# Patient Record
Sex: FEMALE | URBAN - METROPOLITAN AREA
[De-identification: names, ages, dates, MRNs, and addresses within clinical notes are randomized per-mention and may not be internally consistent; named-entity substitution may affect disease eponyms.]

---

## 2021-03-11 ENCOUNTER — APPOINTMENT (RX ONLY)
Dept: URBAN - METROPOLITAN AREA CLINIC 55 | Facility: CLINIC | Age: 36
Setting detail: DERMATOLOGY
End: 2021-03-11

## 2021-03-11 ENCOUNTER — RX ONLY (OUTPATIENT)
Age: 36
Setting detail: RX ONLY
End: 2021-03-11

## 2021-03-11 DIAGNOSIS — Z41.9 ENCOUNTER FOR PROCEDURE FOR PURPOSES OTHER THAN REMEDYING HEALTH STATE, UNSPECIFIED: ICD-10-CM

## 2021-03-11 PROCEDURE — ? COSMETIC CONSULTATION: FILLERS

## 2021-03-11 RX ORDER — VALACYCLOVIR 500 MG/1
TABLET, FILM COATED ORAL
Qty: 10 | Refills: 1 | Status: ERX | COMMUNITY
Start: 2021-03-11

## 2021-03-23 ENCOUNTER — APPOINTMENT (RX ONLY)
Dept: URBAN - METROPOLITAN AREA CLINIC 55 | Facility: CLINIC | Age: 36
Setting detail: DERMATOLOGY
End: 2021-03-23

## 2021-03-23 DIAGNOSIS — Z41.9 ENCOUNTER FOR PROCEDURE FOR PURPOSES OTHER THAN REMEDYING HEALTH STATE, UNSPECIFIED: ICD-10-CM

## 2021-03-23 PROCEDURE — ? DYSPORT

## 2021-03-23 PROCEDURE — ? FILLERS

## 2021-03-23 NOTE — PROCEDURE: FILLERS
Include Cannula Information In Note?: No
Lateral Face Filler Volume In Cc: 0
Anesthesia Type: 1% lidocaine with epinephrine
Lot #: [id number]
Detail Level: Detailed
Include Cannula Information In Note?: Yes
Include Cannula Size?: 27G
Price (Use Numbers Only, No Special Characters Or $): 107
Additional Anesthesia Volume In Cc: 6
Anesthesia Volume In Cc: 0.3
Consent: Written consent obtained. Risks include but not limited to bruising, beading, irregular texture, ulceration, infection, allergic reaction, scar formation, incomplete augmentation, temporary nature, procedural pain.
Expiration Date (Month Year): 3/31/2023
Post-Care Instructions: Patient instructed to apply ice to reduce swelling. Post care handout given to patient.
Filler: Restylane
Map Statment: See Attach Map for Complete Details

## 2021-03-23 NOTE — PROCEDURE: DYSPORT
Show Right And Left Brow Units: No
Show Glabellar Units: Yes
Consent: Written consent obtained. Risks include but not limited to lid/brow ptosis, bruising, swelling, diplopia, temporary effect, incomplete chemical denervation.
Periorbital Skin Units: 20
Left Periorbital Skin Units: 0
Forehead Units: 10
Post-Care Instructions: Patient instructed to not lie down for 4 hours and limit physical activity for 24 hours. Follow up 7 days if needed.
Glabellar Complex Units: 50
Expiration Date (Month Year): 10/31/2021
Additional Area 1 Location: lip
Price (Use Numbers Only, No Special Characters Or $): 490
Detail Level: Detailed
Lot #: [id number]

## 2021-11-29 ENCOUNTER — APPOINTMENT (RX ONLY)
Dept: URBAN - METROPOLITAN AREA CLINIC 55 | Facility: CLINIC | Age: 36
Setting detail: DERMATOLOGY
End: 2021-11-29

## 2021-11-29 DIAGNOSIS — Z41.9 ENCOUNTER FOR PROCEDURE FOR PURPOSES OTHER THAN REMEDYING HEALTH STATE, UNSPECIFIED: ICD-10-CM

## 2021-11-29 PROCEDURE — ? FILLERS

## 2021-11-29 PROCEDURE — ? DYSPORT

## 2021-11-29 NOTE — PROCEDURE: FILLERS
Expiration Date (Month Year): 06/20/2024
Marionette Lines Filler Volume In Cc: 0
Lot #: [id number]
Anesthesia Type: 1% lidocaine with epinephrine
Lot #: [id number]
Price (Use Numbers Only, No Special Characters Or $): 096
Post-Care Instructions: Patient instructed to apply ice to reduce swelling. Post care handout given to patient.
Filler: Restylane Kysse
Use Map Statement For Sites (Optional): No
Additional Anesthesia Volume In Cc: 6
Expiration Date (Month Year): 04/30/23
Include Cannula Information In Note?: Yes
Anesthesia Volume In Cc: 0.3
Expiration Date (Month Year): 10/31/2022
Lot #: [id number]
Detail Level: Detailed
Include Cannula Size?: 27G
Consent: Written consent obtained. Risks include but not limited to bruising, beading, irregular texture, ulceration, infection, allergic reaction, scar formation, incomplete augmentation, temporary nature, procedural pain.
Map Statment: See Attach Map for Complete Details

## 2021-11-29 NOTE — PROCEDURE: DYSPORT
Levator Labii Superioris Units: 0
Show Ucl Units: No
Show Glabellar Units: Yes
Lot #: [id number]
Consent: Written consent obtained. Risks include but not limited to lid/brow ptosis, bruising, swelling, diplopia, temporary effect, incomplete chemical denervation.
Additional Area 1 Location: lip
Price (Use Numbers Only, No Special Characters Or $): 408
Detail Level: Detailed
Post-Care Instructions: Patient instructed to not lie down for 4 hours and limit physical activity for 24 hours. Follow up 7 days if needed.
Dilution (U/0.1 Cc): 10
Glabellar Complex Units: 50
Expiration Date (Month Year): 7/31/2022
Periorbital Skin Units: 20
Forehead Units: 15

## 2022-04-19 ENCOUNTER — APPOINTMENT (RX ONLY)
Dept: URBAN - METROPOLITAN AREA CLINIC 55 | Facility: CLINIC | Age: 37
Setting detail: DERMATOLOGY
End: 2022-04-19

## 2022-04-19 DIAGNOSIS — Z41.9 ENCOUNTER FOR PROCEDURE FOR PURPOSES OTHER THAN REMEDYING HEALTH STATE, UNSPECIFIED: ICD-10-CM

## 2022-04-19 PROCEDURE — ? DYSPORT

## 2022-04-19 NOTE — PROCEDURE: DYSPORT
Lot #: [id number]
Lateral Platysmal Bands Units: 0
Show Additional Area 3: Yes
Consent: Written consent obtained. Risks include but not limited to lid/brow ptosis, bruising, swelling, diplopia, temporary effect, incomplete chemical denervation.
Show Mentalis Units: No
Expiration Date (Month Year): 7/31/2022
Price (Use Numbers Only, No Special Characters Or $): 408
Additional Area 1 Location: lip
Periorbital Skin Units: 25
Forehead Units: 15
Detail Level: Detailed
Post-Care Instructions: Patient instructed to not lie down for 4 hours and limit physical activity for 24 hours. Follow up 7 days if needed.
Glabellar Complex Units: 50
Dilution (U/0.1 Cc): 10

## 2023-05-09 ENCOUNTER — APPOINTMENT (RX ONLY)
Dept: URBAN - METROPOLITAN AREA CLINIC 55 | Facility: CLINIC | Age: 38
Setting detail: DERMATOLOGY
End: 2023-05-09

## 2023-05-09 DIAGNOSIS — Z41.9 ENCOUNTER FOR PROCEDURE FOR PURPOSES OTHER THAN REMEDYING HEALTH STATE, UNSPECIFIED: ICD-10-CM

## 2023-05-09 PROCEDURE — ? DYSPORT

## 2023-05-09 NOTE — PROCEDURE: DYSPORT
Lcl Root Units: 0
Show Right And Left Brow Units: No
Show Depressor Anguli Units: Yes
Additional Area 1 Location: upper cutaneous
Glabellar Complex Units: 50
Forehead Units: 15
Detail Level: Detailed
Show Inventory Tab: Show
Additional Area 2 Location: [facility] [place]
Post-Care Instructions: Patient instructed to not lie down flat for 4 hours or rub the treatment area.
Consent obtained and risk reviewed.
Dilution (U/0.1 Cc): 10
Periorbital Skin Units: 25
Nasal Root Units: 20
Anterior Platysmal Bands Units: 40

## 2024-03-05 ENCOUNTER — RX ONLY (OUTPATIENT)
Age: 39
Setting detail: RX ONLY
End: 2024-03-05

## 2024-03-05 ENCOUNTER — APPOINTMENT (RX ONLY)
Dept: URBAN - METROPOLITAN AREA CLINIC 55 | Facility: CLINIC | Age: 39
Setting detail: DERMATOLOGY
End: 2024-03-05

## 2024-03-05 DIAGNOSIS — Z41.9 ENCOUNTER FOR PROCEDURE FOR PURPOSES OTHER THAN REMEDYING HEALTH STATE, UNSPECIFIED: ICD-10-CM

## 2024-03-05 PROCEDURE — ? INVENTORY

## 2024-03-05 PROCEDURE — ? COSMETIC CONSULTATION: FILLERS

## 2024-03-05 PROCEDURE — ? DYSPORT

## 2024-03-05 RX ORDER — VALACYCLOVIR 500 MG/1
TABLET, FILM COATED ORAL BID
Qty: 10 | Refills: 1 | Status: ERX | COMMUNITY
Start: 2024-03-05

## 2024-03-05 NOTE — PROCEDURE: DYSPORT
Anterior Platysmal Bands Units: 0
Show Masseter Units: Yes
Show Right And Left Brow Units: No
Glabellar Complex Units: 50
Forehead Units: 20
Additional Area 1 Location: upper cutaneous
Detail Level: Detailed
Post-Care Instructions: Patient instructed to not lie down flat for 4 hours or rub the treatment area.
Additional Area 2 Location: chin
Consent obtained and risk reviewed.
Show Inventory Tab: Show
Periorbital Skin Units: 25
Dilution (U/0.1 Cc): 10